# Patient Record
Sex: FEMALE | Race: OTHER | HISPANIC OR LATINO | URBAN - METROPOLITAN AREA
[De-identification: names, ages, dates, MRNs, and addresses within clinical notes are randomized per-mention and may not be internally consistent; named-entity substitution may affect disease eponyms.]

---

## 2024-07-16 RX ORDER — ALBUTEROL 90 MCG
3 AEROSOL REFILL (GRAM) INHALATION
Refills: 0 | DISCHARGE

## 2024-07-16 RX ORDER — OXYCODONE HYDROCHLORIDE 100 MG/5ML
1 SOLUTION ORAL
Refills: 0 | DISCHARGE

## 2024-07-16 RX ORDER — RAMELTEON 8 MG/1
1 TABLET, FILM COATED ORAL
Refills: 0 | DISCHARGE

## 2024-07-16 RX ORDER — SACUBITRIL AND VALSARTAN 97; 103 MG/1; MG/1
1 TABLET, FILM COATED ORAL
Refills: 0 | DISCHARGE

## 2024-07-16 RX ORDER — FUROSEMIDE 10 MG/ML
1 INJECTION, SOLUTION INTRAMUSCULAR; INTRAVENOUS
Refills: 0 | DISCHARGE

## 2024-07-16 RX ORDER — ACETAMINOPHEN 325 MG
3 TABLET ORAL
Refills: 0 | DISCHARGE

## 2024-07-16 NOTE — ASU PATIENT PROFILE, ADULT - NSICDXPASTSURGICALHX_GEN_ALL_CORE_FT
PAST SURGICAL HISTORY:  H/O heart failure     Presence of cardiac pacemaker      72 y/o F PMHx T2dm, htn, hld, MI, pacemaker, dvt. Sent to ED by Dr. Turner for evaluation of RLE swelling x 1.5 weeks. Will contact Dr. Turner to discuss case and plan US to r/o DVT. 72 y/o F PMHx T2dm, htn, hld, MI, pacemaker, dvt. Sent to ED by Dr. Turner for evaluation of RLE swelling x 1.5 weeks. Will contact Dr. Turner to discuss case and plan US to r/o DVT. Discussed with Vascular team, will start heparin. Doppler negative for DVT. vascular team to order CT venogram. Dispo to be determined by findings of study. Signed out to Dr. Dennis. PAST SURGICAL HISTORY:  H/O heart failure     History of  section x4    Presence of cardiac pacemaker

## 2024-07-16 NOTE — ASU PATIENT PROFILE, ADULT - FALL HARM RISK - HARM RISK INTERVENTIONS

## 2024-07-16 NOTE — ASU PATIENT PROFILE, ADULT - NS PREOP UNDERSTANDS INFO
Spoke to patient's daughter  Lucia. have  your mother to be NPO/NO solid food  after  2200 pm tonight,  allow to  drink water or apple juice till  3-4 am, dress  her comfortable, no lotions, no jewelry, no nail polish , Bring ID photo and insurance  cards,  escort  arranged,  address and telephone  given to patient 's mother/yes

## 2024-07-16 NOTE — ASU PATIENT PROFILE, ADULT - NSICDXPASTMEDICALHX_GEN_ALL_CORE_FT
PAST MEDICAL HISTORY:  H/O: hypertension     Heart attack 1-10-22     PAST MEDICAL HISTORY:  Exposure to COVID-19 virus     H/O: hypertension     Heart attack 1-10-22    Pneumonia, pneumococcal      PAST MEDICAL HISTORY:  CAD (coronary artery disease)     Exposure to COVID-19 virus     H/O: hypertension     Heart attack 1-10-22    Pneumonia, pneumococcal     TIA (transient ischemic attack)     Type 2 diabetes mellitus

## 2024-07-17 ENCOUNTER — OUTPATIENT (OUTPATIENT)
Dept: OUTPATIENT SERVICES | Facility: HOSPITAL | Age: 74
LOS: 1 days | Discharge: HOME | End: 2024-07-17

## 2024-07-17 VITALS
TEMPERATURE: 98 F | DIASTOLIC BLOOD PRESSURE: 69 MMHG | HEART RATE: 70 BPM | RESPIRATION RATE: 16 BRPM | SYSTOLIC BLOOD PRESSURE: 152 MMHG

## 2024-07-17 VITALS
DIASTOLIC BLOOD PRESSURE: 60 MMHG | WEIGHT: 126.77 LBS | OXYGEN SATURATION: 98 % | HEIGHT: 63 IN | TEMPERATURE: 99 F | SYSTOLIC BLOOD PRESSURE: 160 MMHG | RESPIRATION RATE: 16 BRPM | HEART RATE: 70 BPM

## 2024-07-17 DIAGNOSIS — Z95.0 PRESENCE OF CARDIAC PACEMAKER: Chronic | ICD-10-CM

## 2024-07-17 DIAGNOSIS — Z86.79 PERSONAL HISTORY OF OTHER DISEASES OF THE CIRCULATORY SYSTEM: Chronic | ICD-10-CM

## 2024-07-17 DIAGNOSIS — Z98.891 HISTORY OF UTERINE SCAR FROM PREVIOUS SURGERY: Chronic | ICD-10-CM

## 2024-07-17 LAB — GLUCOSE BLDC GLUCOMTR-MCNC: 133 MG/DL — HIGH (ref 70–99)

## 2024-07-17 DEVICE — LENS IOL TECNIS EYHANCE DIB00 22.0D
Type: IMPLANTABLE DEVICE | Status: NON-FUNCTIONAL
Removed: 2024-07-17

## 2024-07-17 RX ORDER — KETOROLAC TROMETHAMINE 5 MG/ML
1 SOLUTION OPHTHALMIC
Refills: 0 | Status: COMPLETED | OUTPATIENT
Start: 2024-07-17 | End: 2024-07-17

## 2024-07-17 RX ORDER — PHENYLEPHRINE HCL 2.5 %
1 DROPS OPHTHALMIC (EYE)
Refills: 0 | Status: COMPLETED | OUTPATIENT
Start: 2024-07-17 | End: 2024-07-17

## 2024-07-17 RX ORDER — PANTOPRAZOLE SODIUM 40 MG/10ML
1 INJECTION, POWDER, FOR SOLUTION INTRAVENOUS
Refills: 0 | DISCHARGE

## 2024-07-17 RX ORDER — CYCLOPENTOLATE HYDROCHLORIDE 20 MG/ML
1 SOLUTION/ DROPS OPHTHALMIC
Refills: 0 | Status: COMPLETED | OUTPATIENT
Start: 2024-07-17 | End: 2024-07-17

## 2024-07-17 RX ORDER — EPLERENONE 25 MG/1
1 TABLET ORAL
Refills: 0 | DISCHARGE

## 2024-07-17 RX ORDER — DEXTROSE MONOHYDRATE AND SODIUM CHLORIDE 5; .3 G/100ML; G/100ML
1000 INJECTION, SOLUTION INTRAVENOUS
Refills: 0 | Status: ACTIVE | OUTPATIENT
Start: 2024-07-17 | End: 2025-06-15

## 2024-07-17 RX ORDER — DEXTROSE MONOHYDRATE AND SODIUM CHLORIDE 5; .3 G/100ML; G/100ML
500 INJECTION, SOLUTION INTRAVENOUS
Refills: 0 | Status: ACTIVE | OUTPATIENT
Start: 2024-07-17 | End: 2025-06-15

## 2024-07-17 RX ORDER — SACUBITRIL AND VALSARTAN 97; 103 MG/1; MG/1
1 TABLET, FILM COATED ORAL
Refills: 0 | DISCHARGE

## 2024-07-17 RX ORDER — TETRACAINE HCL 0.5 %
1 DROPS OPHTHALMIC (EYE) ONCE
Refills: 0 | Status: COMPLETED | OUTPATIENT
Start: 2024-07-17 | End: 2024-07-17

## 2024-07-17 RX ORDER — ACETAMINOPHEN 325 MG
650 TABLET ORAL ONCE
Refills: 0 | Status: ACTIVE | OUTPATIENT
Start: 2024-07-17

## 2024-07-17 RX ORDER — TROPICAMIDE 0.5 %
1 DROPS OPHTHALMIC (EYE)
Refills: 0 | Status: COMPLETED | OUTPATIENT
Start: 2024-07-17 | End: 2024-07-17

## 2024-07-17 RX ORDER — OFLOXACIN 3 MG/ML
1 SOLUTION/ DROPS OPHTHALMIC
Refills: 0 | Status: COMPLETED | OUTPATIENT
Start: 2024-07-17 | End: 2024-07-17

## 2024-07-17 RX ORDER — ATORVASTATIN CALCIUM 20 MG/1
1 TABLET, FILM COATED ORAL
Refills: 0 | DISCHARGE

## 2024-07-17 RX ORDER — CARVEDILOL PHOSPHATE 80 MG/1
1 CAPSULE, EXTENDED RELEASE ORAL
Refills: 0 | DISCHARGE

## 2024-07-17 RX ADMIN — Medication 1 DROP(S): at 09:36

## 2024-07-17 RX ADMIN — OFLOXACIN 1 DROP(S): 3 SOLUTION/ DROPS OPHTHALMIC at 09:35

## 2024-07-17 RX ADMIN — CYCLOPENTOLATE HYDROCHLORIDE 1 DROP(S): 20 SOLUTION/ DROPS OPHTHALMIC at 09:44

## 2024-07-17 RX ADMIN — CYCLOPENTOLATE HYDROCHLORIDE 1 DROP(S): 20 SOLUTION/ DROPS OPHTHALMIC at 09:35

## 2024-07-17 RX ADMIN — Medication 1 DROP(S): at 09:45

## 2024-07-17 RX ADMIN — KETOROLAC TROMETHAMINE 1 DROP(S): 5 SOLUTION OPHTHALMIC at 09:35

## 2024-07-17 RX ADMIN — Medication 1 DROP(S): at 09:20

## 2024-07-17 RX ADMIN — KETOROLAC TROMETHAMINE 1 DROP(S): 5 SOLUTION OPHTHALMIC at 09:19

## 2024-07-17 RX ADMIN — OFLOXACIN 1 DROP(S): 3 SOLUTION/ DROPS OPHTHALMIC at 09:44

## 2024-07-17 RX ADMIN — KETOROLAC TROMETHAMINE 1 DROP(S): 5 SOLUTION OPHTHALMIC at 09:45

## 2024-07-17 RX ADMIN — CYCLOPENTOLATE HYDROCHLORIDE 1 DROP(S): 20 SOLUTION/ DROPS OPHTHALMIC at 09:18

## 2024-07-17 RX ADMIN — Medication 1 DROP(S): at 09:25

## 2024-07-17 RX ADMIN — OFLOXACIN 1 DROP(S): 3 SOLUTION/ DROPS OPHTHALMIC at 09:19

## 2024-07-17 NOTE — OPERATIVE REPORT - OPERATIVE RPOSRT DETAILS
PROCEDURE DATE:    SURGEON: MEREDITH TREVINO MD     ANESTHESIA:  MAC.    PREOPERATIVE DIAGNOSIS(ES):  Cataract, Right eye.    POSTOPERATIVE DIAGNOSIS(ES):  Cataract,right eye.    OPERATION:  Cataract extraction with intraocular lens insertion, right eye.    COMPLICATIONS:  None.    SPECIMENS:  None.    ESTIMATED BLOOD LOSS: <1 cc    DESCRIPTION OF PROCEDURE:  The patient was identified in the ASU.  The risks, benefits, and alternatives to surgery were discussed with the patient at length.  Informed consent was obtained.  The right eye was identified and marked.  The patient was then brought to the operating room and placed in the supine position.  The right eye was prepped and draped in the usual sterile fashion for intraocular surgery.  An eyelid speculum was then placed underneath the right eye.    A sideport blade was used to create a paracentesis.  Preservativefree 1% lidocaine and PF epinephrine was injected into the anterior chamber, followed by Trypan Blue to stain the anterior capsule.  This was irrigated out using BSS and followed by Viscoat.  A 2.4 keratome was used to create a temporal clear corneal incision.  A cystotome was used to begin a continuous curvilinear capsulorrhexis, which was finished with Utrata forceps.  Hydrodissection was done with BSS, and the lens was noted to rotate freely in the capsular bag.    Phacoemulsification was accomplished using the divide-and-conquer technique without complications.  Residual cortical material was removed using singlehandpiece polymer tipped coaxial irrigation and aspiration.  Healon was then injected into the capsular bag.  The ELVIN 22.0 diopter lens was implanted into the capsular bag and rotated into proper position using a Kuglen hook.  Irrigation and aspiration was used to remove any residual cortical material and viscoelastic.   All wounds were hydrated and found to be watertight.  The lens was centered, and the anterior chamber was deep.  The intraocular pressure at the end of the case was within physiological limits.  No complications were noted.    Topical antibiotics and steroids and NSAIDs were applied to the surface of the right eye.  The eyelid speculum was removed.  The eye was shielded.  The patient tolerated the procedure well and was brought to the postoperative care unit in stable condition.

## (undated) DEVICE — DRAPE MICROSCOPE KNOB COVER SMALL (2 PCS)

## (undated) DEVICE — CANNULA IRR ANT CHAMBER 30G

## (undated) DEVICE — TRANSFORMER INTREPID I/A 0.3MM

## (undated) DEVICE — NUCLEUS HYDRODISSECTOR PEARCE ANGLED 25G X 22MM

## (undated) DEVICE — SOL IRR BAL SALT 500ML

## (undated) DEVICE — DRSG TAPE MICROPORE SURGICAL TAPE .5"X10 YDS TAN

## (undated) DEVICE — PACK CENTURION 2.75MM

## (undated) DEVICE — GLV 7.5 PROTEXIS (WHITE)

## (undated) DEVICE — MILOOP LENS MILOOP FRAGMENTATION DEVICE

## (undated) DEVICE — SUT NYLON 10-0 12" CU-5

## (undated) DEVICE — PACK ANTERIOR SEGMENT

## (undated) DEVICE — KNIFE ALCON SLIT INTREPID CLEAR-CUT SAFETY 2.4MM

## (undated) DEVICE — Device

## (undated) DEVICE — SLEEVE INTREPID MICROSMOOTH ULTRA INFUSION 0.9MM (PINK)

## (undated) DEVICE — KNIFE ALCON PARACENTESIS CLEARCUT SIDEPORT 1MM (YELLOW)